# Patient Record
Sex: MALE | Race: WHITE | NOT HISPANIC OR LATINO | URBAN - METROPOLITAN AREA
[De-identification: names, ages, dates, MRNs, and addresses within clinical notes are randomized per-mention and may not be internally consistent; named-entity substitution may affect disease eponyms.]

---

## 2022-04-30 ENCOUNTER — TELEPHONE ENCOUNTER (OUTPATIENT)
Dept: URBAN - METROPOLITAN AREA CLINIC 121 | Facility: CLINIC | Age: 72
End: 2022-04-30

## 2022-04-30 RX ORDER — IBUPROFEN AND PSEUDOEPHEDRINE HYDROCHLORIDE 200; 30 MG/1; MG/1
TABLET, SUGAR COATED ORAL
OUTPATIENT
Start: 2008-09-16 | End: 2014-12-08

## 2022-04-30 RX ORDER — IBUPROFEN AND PSEUDOEPHEDRINE HYDROCHLORIDE 200; 30 MG/1; MG/1
TABLET, SUGAR COATED ORAL
OUTPATIENT
Start: 2008-09-16

## 2022-05-01 ENCOUNTER — TELEPHONE ENCOUNTER (OUTPATIENT)
Dept: URBAN - METROPOLITAN AREA CLINIC 121 | Facility: CLINIC | Age: 72
End: 2022-05-01

## 2022-05-01 RX ORDER — EZETIMIBE 10 MG/1
TABLET ORAL
Status: ACTIVE | COMMUNITY
Start: 2014-12-08

## 2022-05-01 RX ORDER — METOPROLOL SUCCINATE 50 MG/1
TABLET, EXTENDED RELEASE ORAL
Status: ACTIVE | COMMUNITY
Start: 2008-09-16

## 2022-05-01 RX ORDER — LOSARTAN POTASSIUM 25 MG/1
TABLET, FILM COATED ORAL
Status: ACTIVE | COMMUNITY
Start: 2014-12-08

## 2025-02-03 ENCOUNTER — LAB OUTSIDE AN ENCOUNTER (OUTPATIENT)
Dept: URBAN - METROPOLITAN AREA CLINIC 27 | Facility: CLINIC | Age: 75
End: 2025-02-03

## 2025-02-03 ENCOUNTER — OFFICE VISIT (OUTPATIENT)
Dept: URBAN - METROPOLITAN AREA CLINIC 27 | Facility: CLINIC | Age: 75
End: 2025-02-03
Payer: MEDICARE

## 2025-02-03 ENCOUNTER — DASHBOARD ENCOUNTERS (OUTPATIENT)
Age: 75
End: 2025-02-03

## 2025-02-03 VITALS — HEIGHT: 68 IN | WEIGHT: 183 LBS | BODY MASS INDEX: 27.74 KG/M2

## 2025-02-03 DIAGNOSIS — I10 ESSENTIAL (PRIMARY) HYPERTENSION: ICD-10-CM

## 2025-02-03 DIAGNOSIS — E66.3 OVERWEIGHT: ICD-10-CM

## 2025-02-03 DIAGNOSIS — Z85.038 PERSONAL HISTORY OF OTHER MALIGNANT NEOPLASM OF LARGE INTESTINE: ICD-10-CM

## 2025-02-03 PROCEDURE — 99204 OFFICE O/P NEW MOD 45 MIN: CPT | Performed by: INTERNAL MEDICINE

## 2025-02-03 RX ORDER — METOPROLOL SUCCINATE 50 MG/1
TABLET, EXTENDED RELEASE ORAL
Qty: 90 TABLET | Status: ACTIVE | COMMUNITY

## 2025-02-03 RX ORDER — METOPROLOL SUCCINATE 50 MG/1
TABLET, EXTENDED RELEASE ORAL
Status: ACTIVE | COMMUNITY
Start: 2008-09-16

## 2025-02-03 RX ORDER — EZETIMIBE 10 MG/1
TABLET ORAL
Status: ACTIVE | COMMUNITY
Start: 2014-12-08

## 2025-02-03 RX ORDER — LOSARTAN POTASSIUM 25 MG/1
TABLET, FILM COATED ORAL
Status: ACTIVE | COMMUNITY
Start: 2014-12-08

## 2025-02-03 NOTE — HPI-TODAY'S VISIT:
This is a 74-year-old male seen at the request of Dr. Arnold for history of colon polyps.  His last colonoscopy was January 2020.  He has no current GI issues.  He is working on losing some weight.  He takes metoprolol 50 and losartan 25 daily

## 2025-02-19 ENCOUNTER — CLAIMS CREATED FROM THE CLAIM WINDOW (OUTPATIENT)
Dept: URBAN - METROPOLITAN AREA SURGERY CENTER 7 | Facility: SURGERY CENTER | Age: 75
End: 2025-02-19
Payer: MEDICARE

## 2025-02-19 ENCOUNTER — CLAIMS CREATED FROM THE CLAIM WINDOW (OUTPATIENT)
Dept: URBAN - METROPOLITAN AREA CLINIC 4 | Facility: CLINIC | Age: 75
End: 2025-02-19
Payer: MEDICARE

## 2025-02-19 DIAGNOSIS — D12.4 ADENOMATOUS POLYP OF DESCENDING COLON: ICD-10-CM

## 2025-02-19 DIAGNOSIS — D12.3 ADENOMATOUS POLYP OF TRANSVERSE COLON: ICD-10-CM

## 2025-02-19 DIAGNOSIS — K57.30 DIVERTICULA OF COLON: ICD-10-CM

## 2025-02-19 DIAGNOSIS — Z98.0 INTESTINAL BYPASS AND ANASTOMOSIS STATUS: ICD-10-CM

## 2025-02-19 DIAGNOSIS — D12.3 ADENOMA OF TRANSVERSE COLON: ICD-10-CM

## 2025-02-19 DIAGNOSIS — K63.5 POLYP OF COLON: ICD-10-CM

## 2025-02-19 DIAGNOSIS — Z12.11 COLON CANCER SCREENING (HIGH RISK): ICD-10-CM

## 2025-02-19 DIAGNOSIS — K63.89 OTHER SPECIFIED DISEASES OF INTESTINE: ICD-10-CM

## 2025-02-19 DIAGNOSIS — D12.3 BENIGN NEOPLASM OF TRANSVERSE COLON: ICD-10-CM

## 2025-02-19 DIAGNOSIS — Z85.038 PERSONAL HISTORY OF OTHER MALIGNANT NEOPLASM OF LARGE INTESTINE: ICD-10-CM

## 2025-02-19 DIAGNOSIS — Z86.0100 PERSONAL HISTORY OF COLONIC POLYPS: ICD-10-CM

## 2025-02-19 PROCEDURE — 45385 COLONOSCOPY W/LESION REMOVAL: CPT | Performed by: INTERNAL MEDICINE

## 2025-02-19 PROCEDURE — 88305 TISSUE EXAM BY PATHOLOGIST: CPT | Performed by: PATHOLOGY

## 2025-02-19 PROCEDURE — 00811 ANES LWR INTST NDSC NOS: CPT | Performed by: NURSE ANESTHETIST, CERTIFIED REGISTERED

## 2025-02-19 PROCEDURE — 88302 TISSUE EXAM BY PATHOLOGIST: CPT | Performed by: PATHOLOGY

## 2025-02-19 RX ORDER — METOPROLOL SUCCINATE 50 MG/1
TABLET, EXTENDED RELEASE ORAL
Status: ACTIVE | COMMUNITY
Start: 2008-09-16

## 2025-02-19 RX ORDER — METOPROLOL SUCCINATE 50 MG/1
TABLET, EXTENDED RELEASE ORAL
Qty: 90 TABLET | Status: ACTIVE | COMMUNITY

## 2025-02-19 RX ORDER — LOSARTAN POTASSIUM 25 MG/1
TABLET, FILM COATED ORAL
Status: ACTIVE | COMMUNITY
Start: 2014-12-08

## 2025-02-19 RX ORDER — EZETIMIBE 10 MG/1
TABLET ORAL
Status: ACTIVE | COMMUNITY
Start: 2014-12-08